# Patient Record
Sex: FEMALE | Race: BLACK OR AFRICAN AMERICAN | NOT HISPANIC OR LATINO | URBAN - METROPOLITAN AREA
[De-identification: names, ages, dates, MRNs, and addresses within clinical notes are randomized per-mention and may not be internally consistent; named-entity substitution may affect disease eponyms.]

---

## 2023-10-12 ENCOUNTER — EMERGENCY (EMERGENCY)
Facility: HOSPITAL | Age: 33
LOS: 1 days | Discharge: HOME | End: 2023-10-12
Attending: EMERGENCY MEDICINE | Admitting: STUDENT IN AN ORGANIZED HEALTH CARE EDUCATION/TRAINING PROGRAM
Payer: COMMERCIAL

## 2023-10-12 VITALS
TEMPERATURE: 98 F | OXYGEN SATURATION: 99 % | DIASTOLIC BLOOD PRESSURE: 63 MMHG | SYSTOLIC BLOOD PRESSURE: 126 MMHG | HEART RATE: 94 BPM | RESPIRATION RATE: 16 BRPM

## 2023-10-12 LAB
ALBUMIN SERPL ELPH-MCNC: 4.4 G/DL — SIGNIFICANT CHANGE UP (ref 3.3–5)
ALBUMIN SERPL ELPH-MCNC: 4.4 G/DL — SIGNIFICANT CHANGE UP (ref 3.3–5)
ALP SERPL-CCNC: 72 U/L — SIGNIFICANT CHANGE UP (ref 40–120)
ALP SERPL-CCNC: 72 U/L — SIGNIFICANT CHANGE UP (ref 40–120)
ALT FLD-CCNC: 6 U/L — SIGNIFICANT CHANGE UP (ref 4–33)
ALT FLD-CCNC: 6 U/L — SIGNIFICANT CHANGE UP (ref 4–33)
ANION GAP SERPL CALC-SCNC: 11 MMOL/L — SIGNIFICANT CHANGE UP (ref 7–14)
ANION GAP SERPL CALC-SCNC: 11 MMOL/L — SIGNIFICANT CHANGE UP (ref 7–14)
APPEARANCE UR: ABNORMAL
AST SERPL-CCNC: 18 U/L — SIGNIFICANT CHANGE UP (ref 4–32)
AST SERPL-CCNC: 18 U/L — SIGNIFICANT CHANGE UP (ref 4–32)
BACTERIA # UR AUTO: ABNORMAL /HPF
BASOPHILS # BLD AUTO: 0.02 K/UL — SIGNIFICANT CHANGE UP (ref 0–0.2)
BASOPHILS NFR BLD AUTO: 0.2 % — SIGNIFICANT CHANGE UP (ref 0–2)
BILIRUB SERPL-MCNC: 0.5 MG/DL — SIGNIFICANT CHANGE UP (ref 0.2–1.2)
BILIRUB SERPL-MCNC: 0.5 MG/DL — SIGNIFICANT CHANGE UP (ref 0.2–1.2)
BILIRUB UR-MCNC: NEGATIVE — SIGNIFICANT CHANGE UP
BUN SERPL-MCNC: 12 MG/DL — SIGNIFICANT CHANGE UP (ref 7–23)
BUN SERPL-MCNC: 12 MG/DL — SIGNIFICANT CHANGE UP (ref 7–23)
CALCIUM SERPL-MCNC: 10.1 MG/DL — SIGNIFICANT CHANGE UP (ref 8.4–10.5)
CALCIUM SERPL-MCNC: 10.1 MG/DL — SIGNIFICANT CHANGE UP (ref 8.4–10.5)
CAST: 0 /LPF — SIGNIFICANT CHANGE UP (ref 0–4)
CHLORIDE SERPL-SCNC: 101 MMOL/L — SIGNIFICANT CHANGE UP (ref 98–107)
CHLORIDE SERPL-SCNC: 101 MMOL/L — SIGNIFICANT CHANGE UP (ref 98–107)
CO2 SERPL-SCNC: 26 MMOL/L — SIGNIFICANT CHANGE UP (ref 22–31)
CO2 SERPL-SCNC: 26 MMOL/L — SIGNIFICANT CHANGE UP (ref 22–31)
COLOR SPEC: YELLOW — SIGNIFICANT CHANGE UP
CREAT SERPL-MCNC: 0.63 MG/DL — SIGNIFICANT CHANGE UP (ref 0.5–1.3)
CREAT SERPL-MCNC: 0.63 MG/DL — SIGNIFICANT CHANGE UP (ref 0.5–1.3)
DIFF PNL FLD: ABNORMAL
EGFR: 119 ML/MIN/1.73M2 — SIGNIFICANT CHANGE UP
EGFR: 119 ML/MIN/1.73M2 — SIGNIFICANT CHANGE UP
EOSINOPHIL # BLD AUTO: 0.12 K/UL — SIGNIFICANT CHANGE UP (ref 0–0.5)
EOSINOPHIL NFR BLD AUTO: 1.3 % — SIGNIFICANT CHANGE UP (ref 0–6)
GLUCOSE SERPL-MCNC: 94 MG/DL — SIGNIFICANT CHANGE UP (ref 70–99)
GLUCOSE SERPL-MCNC: 94 MG/DL — SIGNIFICANT CHANGE UP (ref 70–99)
GLUCOSE UR QL: NEGATIVE MG/DL — SIGNIFICANT CHANGE UP
HCG SERPL-ACNC: <1 MIU/ML — SIGNIFICANT CHANGE UP
HCG SERPL-ACNC: <1 MIU/ML — SIGNIFICANT CHANGE UP
HCT VFR BLD CALC: 36.7 % — SIGNIFICANT CHANGE UP (ref 34.5–45)
HGB BLD-MCNC: 12.1 G/DL — SIGNIFICANT CHANGE UP (ref 11.5–15.5)
IANC: 5.99 K/UL — SIGNIFICANT CHANGE UP (ref 1.8–7.4)
IMM GRANULOCYTES NFR BLD AUTO: 0.3 % — SIGNIFICANT CHANGE UP (ref 0–0.9)
KETONES UR-MCNC: NEGATIVE MG/DL — SIGNIFICANT CHANGE UP
LEUKOCYTE ESTERASE UR-ACNC: ABNORMAL
LIDOCAIN IGE QN: 13 U/L — SIGNIFICANT CHANGE UP (ref 7–60)
LIDOCAIN IGE QN: 13 U/L — SIGNIFICANT CHANGE UP (ref 7–60)
LYMPHOCYTES # BLD AUTO: 2.3 K/UL — SIGNIFICANT CHANGE UP (ref 1–3.3)
LYMPHOCYTES # BLD AUTO: 24.7 % — SIGNIFICANT CHANGE UP (ref 13–44)
MCHC RBC-ENTMCNC: 28.7 PG — SIGNIFICANT CHANGE UP (ref 27–34)
MCHC RBC-ENTMCNC: 33 GM/DL — SIGNIFICANT CHANGE UP (ref 32–36)
MCV RBC AUTO: 87.2 FL — SIGNIFICANT CHANGE UP (ref 80–100)
MONOCYTES # BLD AUTO: 0.85 K/UL — SIGNIFICANT CHANGE UP (ref 0–0.9)
MONOCYTES NFR BLD AUTO: 9.1 % — SIGNIFICANT CHANGE UP (ref 2–14)
NEUTROPHILS # BLD AUTO: 5.99 K/UL — SIGNIFICANT CHANGE UP (ref 1.8–7.4)
NEUTROPHILS NFR BLD AUTO: 64.4 % — SIGNIFICANT CHANGE UP (ref 43–77)
NITRITE UR-MCNC: POSITIVE
NRBC # BLD: 0 /100 WBCS — SIGNIFICANT CHANGE UP (ref 0–0)
NRBC # FLD: 0 K/UL — SIGNIFICANT CHANGE UP (ref 0–0)
PH UR: 6 — SIGNIFICANT CHANGE UP (ref 5–8)
PLATELET # BLD AUTO: 278 K/UL — SIGNIFICANT CHANGE UP (ref 150–400)
POTASSIUM SERPL-MCNC: 4.4 MMOL/L — SIGNIFICANT CHANGE UP (ref 3.5–5.3)
POTASSIUM SERPL-MCNC: 4.4 MMOL/L — SIGNIFICANT CHANGE UP (ref 3.5–5.3)
POTASSIUM SERPL-SCNC: 4.4 MMOL/L — SIGNIFICANT CHANGE UP (ref 3.5–5.3)
POTASSIUM SERPL-SCNC: 4.4 MMOL/L — SIGNIFICANT CHANGE UP (ref 3.5–5.3)
PROT SERPL-MCNC: 8 G/DL — SIGNIFICANT CHANGE UP (ref 6–8.3)
PROT SERPL-MCNC: 8 G/DL — SIGNIFICANT CHANGE UP (ref 6–8.3)
PROT UR-MCNC: 30 MG/DL
RBC # BLD: 4.21 M/UL — SIGNIFICANT CHANGE UP (ref 3.8–5.2)
RBC # FLD: 14.1 % — SIGNIFICANT CHANGE UP (ref 10.3–14.5)
RBC CASTS # UR COMP ASSIST: 11 /HPF — HIGH (ref 0–4)
SODIUM SERPL-SCNC: 138 MMOL/L — SIGNIFICANT CHANGE UP (ref 135–145)
SODIUM SERPL-SCNC: 138 MMOL/L — SIGNIFICANT CHANGE UP (ref 135–145)
SP GR SPEC: 1.01 — SIGNIFICANT CHANGE UP (ref 1–1.03)
SQUAMOUS # UR AUTO: 2 /HPF — SIGNIFICANT CHANGE UP (ref 0–5)
UROBILINOGEN FLD QL: 0.2 MG/DL — SIGNIFICANT CHANGE UP (ref 0.2–1)
WBC # BLD: 9.31 K/UL — SIGNIFICANT CHANGE UP (ref 3.8–10.5)
WBC # FLD AUTO: 9.31 K/UL — SIGNIFICANT CHANGE UP (ref 3.8–10.5)
WBC UR QL: 289 /HPF — HIGH (ref 0–5)

## 2023-10-12 PROCEDURE — G1004: CPT

## 2023-10-12 PROCEDURE — 76830 TRANSVAGINAL US NON-OB: CPT | Mod: 26

## 2023-10-12 PROCEDURE — 74177 CT ABD & PELVIS W/CONTRAST: CPT | Mod: 26,ME

## 2023-10-12 PROCEDURE — 99223 1ST HOSP IP/OBS HIGH 75: CPT

## 2023-10-12 RX ORDER — KETOROLAC TROMETHAMINE 30 MG/ML
15 SYRINGE (ML) INJECTION EVERY 6 HOURS
Refills: 0 | Status: DISCONTINUED | OUTPATIENT
Start: 2023-10-12 | End: 2023-10-13

## 2023-10-12 RX ORDER — CEFTRIAXONE 500 MG/1
500 INJECTION, POWDER, FOR SOLUTION INTRAMUSCULAR; INTRAVENOUS ONCE
Refills: 0 | Status: COMPLETED | OUTPATIENT
Start: 2023-10-12 | End: 2023-10-12

## 2023-10-12 RX ORDER — SODIUM CHLORIDE 9 MG/ML
1000 INJECTION INTRAMUSCULAR; INTRAVENOUS; SUBCUTANEOUS
Refills: 0 | Status: DISCONTINUED | OUTPATIENT
Start: 2023-10-12 | End: 2023-10-12

## 2023-10-12 RX ORDER — KETOROLAC TROMETHAMINE 30 MG/ML
15 SYRINGE (ML) INJECTION ONCE
Refills: 0 | Status: DISCONTINUED | OUTPATIENT
Start: 2023-10-12 | End: 2023-10-12

## 2023-10-12 RX ORDER — ONDANSETRON 8 MG/1
4 TABLET, FILM COATED ORAL EVERY 8 HOURS
Refills: 0 | Status: DISCONTINUED | OUTPATIENT
Start: 2023-10-12 | End: 2023-10-15

## 2023-10-12 RX ORDER — CEFTRIAXONE 500 MG/1
1000 INJECTION, POWDER, FOR SOLUTION INTRAMUSCULAR; INTRAVENOUS ONCE
Refills: 0 | Status: COMPLETED | OUTPATIENT
Start: 2023-10-12 | End: 2023-10-12

## 2023-10-12 RX ORDER — SODIUM CHLORIDE 9 MG/ML
1000 INJECTION INTRAMUSCULAR; INTRAVENOUS; SUBCUTANEOUS ONCE
Refills: 0 | Status: COMPLETED | OUTPATIENT
Start: 2023-10-12 | End: 2023-10-12

## 2023-10-12 RX ORDER — CEFTRIAXONE 500 MG/1
1000 INJECTION, POWDER, FOR SOLUTION INTRAMUSCULAR; INTRAVENOUS EVERY 24 HOURS
Refills: 0 | Status: DISCONTINUED | OUTPATIENT
Start: 2023-10-12 | End: 2023-10-15

## 2023-10-12 RX ORDER — MORPHINE SULFATE 50 MG/1
4 CAPSULE, EXTENDED RELEASE ORAL ONCE
Refills: 0 | Status: DISCONTINUED | OUTPATIENT
Start: 2023-10-12 | End: 2023-10-12

## 2023-10-12 RX ORDER — ACETAMINOPHEN 500 MG
1000 TABLET ORAL ONCE
Refills: 0 | Status: COMPLETED | OUTPATIENT
Start: 2023-10-12 | End: 2023-10-12

## 2023-10-12 RX ORDER — OXYCODONE HYDROCHLORIDE 5 MG/1
5 TABLET ORAL EVERY 6 HOURS
Refills: 0 | Status: DISCONTINUED | OUTPATIENT
Start: 2023-10-12 | End: 2023-10-13

## 2023-10-12 RX ORDER — SODIUM CHLORIDE 9 MG/ML
1000 INJECTION INTRAMUSCULAR; INTRAVENOUS; SUBCUTANEOUS
Refills: 0 | Status: DISCONTINUED | OUTPATIENT
Start: 2023-10-12 | End: 2023-10-15

## 2023-10-12 RX ORDER — ONDANSETRON 8 MG/1
4 TABLET, FILM COATED ORAL ONCE
Refills: 0 | Status: COMPLETED | OUTPATIENT
Start: 2023-10-12 | End: 2023-10-12

## 2023-10-12 RX ORDER — ACETAMINOPHEN 500 MG
975 TABLET ORAL EVERY 6 HOURS
Refills: 0 | Status: DISCONTINUED | OUTPATIENT
Start: 2023-10-12 | End: 2023-10-15

## 2023-10-12 RX ADMIN — ONDANSETRON 4 MILLIGRAM(S): 8 TABLET, FILM COATED ORAL at 11:25

## 2023-10-12 RX ADMIN — Medication 100 MILLIGRAM(S): at 15:57

## 2023-10-12 RX ADMIN — CEFTRIAXONE 100 MILLIGRAM(S): 500 INJECTION, POWDER, FOR SOLUTION INTRAMUSCULAR; INTRAVENOUS at 13:20

## 2023-10-12 RX ADMIN — Medication 400 MILLIGRAM(S): at 16:58

## 2023-10-12 RX ADMIN — Medication 15 MILLIGRAM(S): at 21:49

## 2023-10-12 RX ADMIN — MORPHINE SULFATE 4 MILLIGRAM(S): 50 CAPSULE, EXTENDED RELEASE ORAL at 13:19

## 2023-10-12 RX ADMIN — Medication 15 MILLIGRAM(S): at 14:00

## 2023-10-12 RX ADMIN — SODIUM CHLORIDE 200 MILLILITER(S): 9 INJECTION INTRAMUSCULAR; INTRAVENOUS; SUBCUTANEOUS at 22:31

## 2023-10-12 RX ADMIN — CEFTRIAXONE 500 MILLIGRAM(S): 500 INJECTION, POWDER, FOR SOLUTION INTRAMUSCULAR; INTRAVENOUS at 15:57

## 2023-10-12 RX ADMIN — SODIUM CHLORIDE 1000 MILLILITER(S): 9 INJECTION INTRAMUSCULAR; INTRAVENOUS; SUBCUTANEOUS at 11:26

## 2023-10-12 RX ADMIN — SODIUM CHLORIDE 1000 MILLILITER(S): 9 INJECTION INTRAMUSCULAR; INTRAVENOUS; SUBCUTANEOUS at 16:58

## 2023-10-12 RX ADMIN — MORPHINE SULFATE 4 MILLIGRAM(S): 50 CAPSULE, EXTENDED RELEASE ORAL at 11:25

## 2023-10-12 NOTE — ED ADULT TRIAGE NOTE - HEART RATE (BEATS/MIN)
94 Gabapentin Counseling: I discussed with the patient the risks of gabapentin including but not limited to dizziness, somnolence, fatigue and ataxia.

## 2023-10-12 NOTE — ED CDU PROVIDER INITIAL DAY NOTE - PHYSICAL EXAMINATION
CONSTITUTIONAL: Well-appearing; well-nourished; in no apparent distress. Non-toxic appearing.   NEURO: Alert & oriented. Gait steady without assistance. Sensory and motor functions are grossly intact.  PSYCH: Mood appropriate. Thought processes intact.   NECK: Supple  CARD: Regular rate and rhythm, no murmurs  RESP: No accessory muscle use; breath sounds clear and equal bilaterally; no wheezes, rhonchi, or rales     ABD: Soft; non-distended. (+) suprapubic abdominal pain. No guarding or rebound.   : No CVA tenderness b/l.   MUSCULOSKELETAL/EXTREMITIES: FROM in all four extremities; no extremity edema.  SKIN: Warm; dry; no apparent lesions or exudate

## 2023-10-12 NOTE — ED PROVIDER NOTE - OBJECTIVE STATEMENT
34 y/o female with no significant PMHx who presented to the ED for RLQ abd pain X 1 days. Pt states over the past 2 days having RLQ abd pain with nausea, vomiting, and diarrhea. Pt does admit she did have Mong[place]ian food prior to symptoms. Pt denies any fever, chills, nausea, vomiting, SOB, chest pain, dysuria, or vaginal bleeding.

## 2023-10-12 NOTE — ED ADULT NURSE REASSESSMENT NOTE - NS ED NURSE REASSESS COMMENT FT1
Received report from Day Dominik Campbell: Pt A&Ox4, ambulatory at baseline, complaining of lower abdomen, medicated as per orders, orthostatic bp noted in flowsheet, pt endorses lightheadedness during ortho bp measurments, ROSA Braxton made aware, awaiting for further intervention, denies any other complaints at this moment, Report given to CDU, pt transported to CDU. Safety precautions implemented as per protocol, awaiting further MD orders, plan of care ongoing.

## 2023-10-12 NOTE — CONSULT NOTE ADULT - ASSESSMENT
32yo  LMP  presenting with acute on chronic RLQ pain. Patient with stable vital signs, afebrile. Physical exam notable for right adnexal tenderness to palpation, no cervical motion tenderness. WBC 9.31. TVUS showing right hydrosalpinx and a small fibroid. Hydrosalpinx is likely the cause of RLQ pain. Differential for cause of hydrosalpinx includes endometriosis, supported by the fact that pain worsens prior to menses. Low concern for PID due to no fever, no leukocytosis, and no cervical motion tenderness.  - No acute GYN intervention  - GC/Chlamydia cervical swab collected  - Do not recommend treatment for PID at this time  - Patient to follow up as scheduled at [facility] on 10/19 for long-term management of pelvic pain  - GYN will continue to follow, primary management per ED    Zoraida Duran, PGY2  d/w Dr. Shelby 32yo  LMP  presenting with acute on chronic RLQ pain. Patient with stable vital signs, afebrile. Physical exam notable for right adnexal tenderness to palpation, no cervical motion tenderness. WBC 9.31. TVUS showing right hydrosalpinx and a small fibroid. Hydrosalpinx is not likely the cause of RLQ pain. Differential for cause of hydrosalpinx includes endometriosis, supported by the fact that pain worsens prior to menses. Low concern for PID due to no fever, no leukocytosis, and no cervical motion tenderness.  - No acute GYN intervention  - GC/Chlamydia cervical swab collected  - Do not recommend treatment for PID at this time  - Patient to follow up as scheduled at [facility] on 10/19 for long-term management of pelvic pain  - GYN will continue to follow, primary management per ED    Zoraida Duran, PGY2  d/w Dr. Shelby    34y/o  LMP  with RLQ pain noted to have R hydrosalpix, no clinical signs os infection. To f/u with Gyn as an outpatient.  Fam Shelby M.D.

## 2023-10-12 NOTE — ED PROVIDER NOTE - PROGRESS NOTE DETAILS
DO Mala (PGY-3): Pelvic exam performed chaperoned by Stacie Lamb RN, NP student. Cervical os is closed and without erythema. Right adnexal tenderness. Everton Lott MD (PGY-3) Patient signed out to me by the day team.  She is a 33-year-old female presented originally with periumbilical pain, CT scan negative for appendicitis however right-sided tubular lesion was noted she had right-sided adnexal tenderness by her taking exam.  Currently being treated empirically for pelvic inflammatory disease. The patient's lab work is notable for evidence of UTI, serology unremarkable. Everton Lott MD (PGY-3) Vaginal ultrasound was remarkable for likely hydrosalpinx. patient is well-appearing now with recurrence of pain will write for additional IV Toradol.  Spoke to CDU to admit for continuous monitoring pain management and fluids.  Will get orthostatic blood pressures and ob/gyn consult. CDU consulted for pain management

## 2023-10-12 NOTE — CONSULT NOTE ADULT - SUBJECTIVE AND OBJECTIVE BOX
STEPH JC  33y  Female [id number]    HPI: 34yo  LMP  presenting with acute on chronic RLQ pain. Patient initially started having daily RLQ pain in August. She was evaluated in [facility] and treated with doxycycline and metronidazole for presumed PID. She continued to have pain after this treatment. She says that pain gets worse right before her menses. She has a history of irregular menses, occurring every 60 days, and dysmenorrhea. She followed up with [facility]. She was told that she has an ovarian cyst and a fibroid and was told to follow up on 10/19 for results of additional tests, patient uncertain which tests were performed. She is presenting today because RLQ pain became more severe and is not relieved with ibuprofen. She has also had vomiting and diarrhea since Monday after eating [place] food. Pain is relieved by Toradol in the ED. She denies purulent vaginal discharge and vaginal bleeding. She denies fever, chills, chest pain, shortness of breath, palpitations.       Name of GYN Physician: [facility]    POB:   x1, TOP s/p D&C x2    Pgyn: Newly diagnosed fibroid. Patient denies endometriosis, STI's, Abnormal pap smears. Sexually active with one partner for the past 5 months. Three lifetime sexual partners.    Home meds: multivitamin    [facility] Meds:   MEDICATIONS  (STANDING):  cefTRIAXone   IVPB 1000 milliGRAM(s) IV Intermittent every 24 hours  sodium chloride 0.9%. 1000 milliLiter(s) (200 mL/Hr) IV Continuous <Continuous>    MEDICATIONS  (PRN):  acetaminophen     Tablet .. 975 milliGRAM(s) Oral every 6 hours PRN Temp greater or equal to 38C (100.4F), Mild Pain (1 - 3)  ketorolac   Injectable 15 milliGRAM(s) IV Push every 6 hours PRN Moderate Pain (4 - 6)  ondansetron Injectable 4 milliGRAM(s) IV Push every 8 hours PRN Nausea and/or Vomiting  oxyCODONE    IR 5 milliGRAM(s) Oral every 6 hours PRN Severe Pain (7 - 10)    Allergies  latex (Unknown)  No Known Drug Allergies      PAST MEDICAL & SURGICAL HISTORY:  No pertinent past medical history      D&C x2        Social History:  Denies smoking use, drug use. +occasional social alcohol use        Vital Signs Last 24 Hrs  T(C): 36.9 (12 Oct 2023 20:05), Max: 36.9 (12 Oct 2023 20:05)  T(F): 98.5 (12 Oct 2023 20:05), Max: 98.5 (12 Oct 2023 20:05)  HR: 70 (12 Oct 2023 20:05) (70 - 94)  BP: 97/45 (12 Oct 2023 20:05) (95/52 - 126/63)  BP(mean): --  RR: 16 (12 Oct 2023 20:05) (16 - 18)  SpO2: 100% (12 Oct 2023 20:05) (99% - 100%)    Parameters below as of 12 Oct 2023 20:05  Patient On (Oxygen Delivery Method): room air        Physical Exam:   General: sitting comftorably in bed, NAD   Abd: Soft, mild RLQ tenderness to palpation, non-distended.    :  No bleeding on pad.  External labia wnl.  Bimanual exam with no cervical motion tenderness, 6wk size mobile uterus, +R adnexal tenderness, adnexa non palpable b/l.  Cervix closed   Speculum Exam: No active bleeding from os.  Physiologic discharge.          LABS:               12.1   9.31  )-----------( 278      ( 12 Oct 2023 11:11 )             36.7     10-12    138  |  101  |  12  ----------------------------<  94  4.4   |  26  |  0.63    Ca    10.1      12 Oct 2023 11:11    TPro  8.0  /  Alb  4.4  /  TBili  0.5  /  DBili  x   /  AST  18  /  ALT  6   /  AlkPhos  72  10-12    I&O's Detail      Urinalysis Basic - ( 12 Oct 2023 11:11 )    Color: Yellow / Appearance: Cloudy / S.015 / pH: x  Gluc: 94 mg/dL / Ketone: Negative mg/dL  / Bili: Negative / Urobili: 0.2 mg/dL   Blood: x / Protein: 30 mg/dL / Nitrite: Positive   Leuk Esterase: Large / RBC: 11 /HPF /  /HPF   Sq Epi: x / Non Sq Epi: 2 /HPF / Bacteria: Few /HPF        RADIOLOGY & ADDITIONAL STUDIES:    CTAP(10/12)  FINDINGS:  LOWER CHEST: Within normal limits.    LIVER: Within normal limits.  BILE DUCTS: Normal caliber.  GALLBLADDER: Within normal limits.  SPLEEN: Within normal limits.  PANCREAS: Within normal limits.  ADRENALS: Within normal limits.  KIDNEYS/URETERS: A small left interpole cyst. No hydronephrosis.    BLADDER: Within normal limits.  REPRODUCTIVE ORGANS: Normal uterus and bilateral ovaries. A tubular   cystic structure in the right adnexa, question hydrosalpinx.    BOWEL: No bowel obstruction. Appendix is normal.  PERITONEUM: No ascites. Mildly prominent lymph nodes in the ileal   mesentery measuring up to 1 cm in short axis (602:44), question   mesenteric adenitis.  VESSELS: Within normal limits.  RETROPERITONEUM/LYMPH NODES: No lymphadenopathy.  ABDOMINAL WALL: Within normal limits.  BONES: Within normal limits.    IMPRESSION:  Normal appendix.    Mildly prominent lymph nodes in the right lower quadrant/ileal mesentery,   question mesenteric adenitis.    A tubular cystic structure in the right adnexa suggestive of   hydrosalpinx. Consider more definitive characterization with pelvic   ultrasound.      TVUS (10/12)  FINDINGS:  Uterus: 8.2 cm x 5.5 cm x 6.2 cm. Intramural myoma measures 1.4 cm.  Endometrium: 8 mm. Within normal limits.    Right ovary: 3.8 cm x 2.7 cm x 3.2 cm. Within normal limits. Right corpus   luteum measures 2.2 cm. normal arterial and venous waveforms. Tubular   structure in the right adnexa consistent with hydrosalpinx.  Left ovary: 2.4 cm x 1.4 cm x 1.5 cm. Within normal limits.    Fluid: None.    IMPRESSION:  *  Small uterine myoma.  *  Tubular structure in the right consistent adnexa with hydrosalpinx.  *  Right corpus luteum.

## 2023-10-12 NOTE — ED PROVIDER NOTE - CLINICAL SUMMARY MEDICAL DECISION MAKING FREE TEXT BOX
32 y/o female with no significant PMHx who presented to the ED for RLQ abd pain X 1 days.   Concern for Appendicitis/Diverticulitis/Colitis/UTI  Labs, UA, CT, IVF, Antiemetics, Analgesia

## 2023-10-12 NOTE — ED CDU PROVIDER INITIAL DAY NOTE - NSICDXFAMILYHX_GEN_ALL_CORE_FT
FAMILY HISTORY:  No pertinent family history in first degree relatives FAMILY HISTORY:  Father  Still living? Yes, Estimated age: Age Unknown  FH: prostate cancer, Age at diagnosis: Age Unknown    Mother  Still living? Yes, Estimated age: Age Unknown  FH: HTN (hypertension), Age at diagnosis: Age Unknown    Sibling  Still living? Yes, Estimated age: Age Unknown  FH: asthma, Age at diagnosis: Age Unknown

## 2023-10-12 NOTE — ED PROVIDER NOTE - ATTENDING CONTRIBUTION TO CARE
Pt was seen and evaluated by me. Pt is a 32 y/o female with no significant PMHx who presented to the ED for RLQ abd pain X 1 days. Pt states over the past 2 days having RLQ abd pain with nausea, vomiting, and diarrhea. Pt does admit she did have [place] food prior to symptoms. Pt denies any fever, chills, nausea, vomiting, SOB, chest pain, dysuria, or vaginal bleeding.  VITALS: Vitals have been reviewed.  GEN APPEARANCE: Alert and cooperative, non-toxic appearing and in mild distress from abd pain  HEAD: Atraumatic, normocephalic.   EYES: PERRL, EOMI.   EARS: Gross hearing intact.   NOSE: No nasal discharge.   THROAT: MMM. Oral cavity and pharynx normal.   CV: RRR, S1S2, no c/r/m/g. No cyanosis or pallor. Extremities warm, well perfused. Cap refill <2 seconds. No bruits.   LUNGS: CTAB. No wheezing. No rales. No rhonchi. No diminished breath sounds.   ABDOMEN: Soft, tender to RLQ and suprapubic tenderness   MSK/EXT: Spine appears normal, no spine point tenderness.   NEURO: Alert, follows commands. Speech normal. Sensation and motor normal x4 extremities.   SKIN: Normal color for race, warm, dry and intact. No evidence of rash.  32 y/o female with no significant PMHx who presented to the ED for RLQ abd pain X 1 days.   Concern for Appendicitis/Diverticulitis/Colitis/UTI  Labs, UA, CT, IVF, Antiemetics, Analgesia

## 2023-10-12 NOTE — ED ADULT TRIAGE NOTE - CHIEF COMPLAINT QUOTE
Pt from home c/o lower abd pain, worse on rt side x 2 days associated with nausea, vomiting and diarrhea. Reports she had ultrasound 2 days ago but has not received results. Pt appears uncomfortable in triage. Denies vaginal bleeding/discharge, dysuria, hematuria.

## 2023-10-12 NOTE — ED ADULT NURSE NOTE - OBJECTIVE STATEMENT
33 year old female patient AOX4,  presents to the ED complains of severe non-radiating  right lower abdominal pain of 10/10 with diarrhea, nausea and vomiting for the last three days. Denied dysuria, hematuria, chills and fever. Reported having a strong smell in her urine. Denied any medical problem. 20ga IV to right AC. blood and urine collected and UCG completed. NS 1000ml, Zofran 4mg and morphine 4mg administered. CT scan is ordered. Will continue to monitor for any changes.

## 2023-10-12 NOTE — ED CDU PROVIDER INITIAL DAY NOTE - OBJECTIVE STATEMENT
33-year-old female with no pertinent past medical history not on any daily medications presented to the ED c/o abdominal pain x2-3 days.  Following work-up in the emergency department patient found to have UTI in addition to a right-sided hydrosalpinx that was confirmed on CT as well as transvaginal ultrasound.  Patient has no leukocytosis.  Afebrile.  Patient hypotensive with positive orthostatic vital signs.  Patient placed in CDU pending OB consult, pain management, IV fluids and antibiotics. 33-year-old female with no pertinent past medical history not on any daily medications presented to the ED c/o abdominal pain x2-3 days. Associated with chills, nausea, and vaginal discharge described as a light pinkish color with mild odor that started yesterday. Following work-up in the emergency department patient found to have UTI in addition to a right-sided hydrosalpinx that was confirmed on CT as well as transvaginal ultrasound.  Patient has no leukocytosis.  Afebrile.  Patient hypotensive with positive orthostatic vital signs.  Patient placed in CDU pending OB consult, pain management, IV fluids and antibiotics.

## 2023-10-12 NOTE — ED CDU PROVIDER INITIAL DAY NOTE - ATTENDING APP SHARED VISIT CONTRIBUTION OF CARE
Pt was seen and evaluated by me. Pt is a 34 y/o female with no significant PMHx who presented to the ED for RLQ abd pain X 1 days. Pt states over the past 2 days having RLQ abd pain with nausea, vomiting, and diarrhea. Pt does admit she did have [place] food prior to symptoms. Pt denies any fever, chills, nausea, vomiting, SOB, chest pain, dysuria, or vaginal bleeding. CT and US confirmed hydrosalpinx. Sent to OBS for pain control  VITALS: Vitals have been reviewed.  GEN APPEARANCE: Alert and cooperative, non-toxic appearing and in mild distress from abd pain  HEAD: Atraumatic, normocephalic.   EYES: PERRL, EOMI.   EARS: Gross hearing intact.   NOSE: No nasal discharge.   THROAT: MMM. Oral cavity and pharynx normal.   CV: RRR, S1S2, no c/r/m/g. No cyanosis or pallor. Extremities warm, well perfused. Cap refill <2 seconds. No bruits.   LUNGS: CTAB. No wheezing. No rales. No rhonchi. No diminished breath sounds.   ABDOMEN: Soft, tender to RLQ and suprapubic tenderness   MSK/EXT: Spine appears normal, no spine point tenderness.   NEURO: Alert, follows commands. Speech normal. Sensation and motor normal x4 extremities.   SKIN: Normal color for race, warm, dry and intact. No evidence of rash.  34 y/o female with no significant PMHx who presented to the ED for RLQ abd pain X 1 days.   Concern for PID/UTI  Sent to OBS for Antibiotics and pain control

## 2023-10-12 NOTE — ED CDU PROVIDER INITIAL DAY NOTE - NS ED ATTENDING STATEMENT MOD
This was a shared visit with the [facility]. I reviewed and verified the documentation and independently performed the documented:

## 2023-10-12 NOTE — ED PROVIDER NOTE - CONSTITUTIONAL, MLM
Awake, alert, oriented to person, place, time/situation and in mild distress from abd pain. normal...

## 2023-10-12 NOTE — ED ADULT NURSE REASSESSMENT NOTE - NS ED NURSE REASSESS COMMENT FT1
Patient stated that the pain medication is not giving her any relief. MD made aware and additional morphine 4mg  is ordered with 1MG of ceftriaxone Intravenously.

## 2023-10-13 VITALS
HEART RATE: 82 BPM | SYSTOLIC BLOOD PRESSURE: 109 MMHG | OXYGEN SATURATION: 100 % | RESPIRATION RATE: 18 BRPM | DIASTOLIC BLOOD PRESSURE: 49 MMHG | TEMPERATURE: 98 F

## 2023-10-13 PROCEDURE — 99238 HOSP IP/OBS DSCHRG MGMT 30/<: CPT

## 2023-10-13 RX ORDER — CEFDINIR 250 MG/5ML
1 POWDER, FOR SUSPENSION ORAL
Qty: 12 | Refills: 0
Start: 2023-10-13 | End: 2023-10-18

## 2023-10-13 RX ORDER — OXYCODONE HYDROCHLORIDE 5 MG/1
1 TABLET ORAL
Qty: 6 | Refills: 0
Start: 2023-10-13 | End: 2023-10-14

## 2023-10-13 RX ADMIN — Medication 15 MILLIGRAM(S): at 07:42

## 2023-10-13 RX ADMIN — Medication 15 MILLIGRAM(S): at 01:18

## 2023-10-13 RX ADMIN — Medication 15 MILLIGRAM(S): at 01:33

## 2023-10-13 RX ADMIN — SODIUM CHLORIDE 200 MILLILITER(S): 9 INJECTION INTRAMUSCULAR; INTRAVENOUS; SUBCUTANEOUS at 07:44

## 2023-10-13 RX ADMIN — OXYCODONE HYDROCHLORIDE 5 MILLIGRAM(S): 5 TABLET ORAL at 06:53

## 2023-10-13 RX ADMIN — SODIUM CHLORIDE 200 MILLILITER(S): 9 INJECTION INTRAMUSCULAR; INTRAVENOUS; SUBCUTANEOUS at 05:55

## 2023-10-13 RX ADMIN — Medication 15 MILLIGRAM(S): at 07:57

## 2023-10-13 RX ADMIN — OXYCODONE HYDROCHLORIDE 5 MILLIGRAM(S): 5 TABLET ORAL at 05:53

## 2023-10-13 NOTE — ED CDU PROVIDER DISPOSITION NOTE - PATIENT PORTAL LINK FT
You can access the FollowMyHealth Patient Portal offered by [facility] by registering at the following website: [email or web address]. By joining "[employer]’s FollowMyHealth portal, you will also be able to view your health information using other applications (apps) compatible with our system.

## 2023-10-13 NOTE — ED CDU PROVIDER SUBSEQUENT DAY NOTE - ATTENDING APP SHARED VISIT CONTRIBUTION OF CARE
33-year-old female with no pertinent past medical history not on any daily medications presented to the ED c/o abdominal pain x2-3 days. Found to have hydrosalpinx and UTI and admitted to CDU for abx and pain control. Evaluated by ObGyn who report no concern for PID and no other recommendations at this time. This morning pt reports pain is overall improved, especially RLQ pain but is now having some L side pain most likely related to UTI as CT yesterday was normal. Pain better under control today with oral oxy and tylenol. Will d/c with Rx for pain medications and abx for UTI. She will follow-up with her outpatient ObGyn (has an appointment scheduled for 10/19).

## 2023-10-13 NOTE — ED CDU PROVIDER SUBSEQUENT DAY NOTE - CLINICAL SUMMARY MEDICAL DECISION MAKING FREE TEXT BOX
33-year-old female with no pertinent past medical history not on any daily medications presented to the ED c/o abdominal pain x2-3 days. Associated with chills, nausea, and vaginal discharge described as a light pinkish color with mild odor that started yesterday. Following work-up in the emergency department patient found to have UTI in addition to a right-sided hydrosalpinx that was confirmed on CT as well as transvaginal ultrasound.  Patient has no leukocytosis.  Afebrile.  Patient hypotensive with positive orthostatic vital signs.  Patient placed in CDU pain management, IV fluids and antibiotics.

## 2023-10-13 NOTE — ED CDU PROVIDER DISPOSITION NOTE - NSFOLLOWUPINSTRUCTIONS_ED_ALL_ED_FT
Take cefdinir for 5 days.    Follow up with your obgyn in 1 week.     Return to the ER if having worsening pain, nausea, vomiting, fever, painful urination, blood in urine. Take cefdinir for another 6 days.    Take Tylenol 650mg (Two 325 mg pills) every 4-6 hours as needed for pain or fevers.     Take Motrin 600 mg every 8 hours as needed for moderate pain or fevers -- take with food.     Take oxycodone 5 mg one pill once every 8 hours as needed for severe pain -- causes drowsiness; DO NOT drink alcohol, drive, or operate heavy machinery with this medication.  Caution federal law prohibits the transfer of this drug to any person other  than the person for whom it was prescribed. May cause drowsiness.  Alcohol may intensify this effect.  Use care when operating dangerous machinery.  This prescription cannot be refilled. Using more of this medication than prescribed may cause serious breathing problems.     Follow up with your obgyn in 1 week.     Return to the ER if having worsening pain, nausea, vomiting, fever, painful urination, blood in urine.

## 2023-10-13 NOTE — ED CDU PROVIDER DISPOSITION NOTE - CLINICAL COURSE
33-year-old female with no pertinent past medical history not on any daily medications presented to the ED c/o abdominal pain x2-3 days. Associated with chills, nausea, and vaginal discharge described as a light pinkish color with mild odor that started yesterday. Following work-up in the emergency department patient found to have UTI in addition to a right-sided hydrosalpinx that was confirmed on CT as well as transvaginal ultrasound.  Patient has no leukocytosis.  Afebrile.  Patient hypotensive with positive orthostatic vital signs.  Patient placed in CDU pending OB consult, pain management, IV fluids and antibiotics. pt saw by OB team who had Low concern for PID, took GC/chlamydia swab, and recommended outpatient f/u. pt has upcoming appointment at [facility] on 10/19 for long-term management of pelvic pain. pt re-evaluated in the morning, feeling much better, pain under control with oxycodone and toradol and tylenol. mild left flank tenderness, might be related to UTI. stable for discharge on PO cefdinir and obgyn f/u.

## 2023-10-13 NOTE — ED CDU PROVIDER SUBSEQUENT DAY NOTE - PROGRESS NOTE DETAILS
pt re-evaluated in the morning, feeling much better, pain under control with oxycodone and toradol and tylenol. mild left flank tenderness, might be related to UTI. stable for discharge on PO cefdinir and obgyn f/u.

## 2023-10-13 NOTE — CHART NOTE - NSCHARTNOTEFT_GEN_A_CORE
SW alerted by RN that pt is cleared for DC and is requesting assistance with transportation. SW met with pt and confirmed address and contact information. SW arranged trip through [facility] (Inv# [id number]). Trip assigned to [employer] (p:[phone]) for 10:30am pickup.     No other SW needs identified at this time.

## 2023-10-13 NOTE — ED CDU PROVIDER SUBSEQUENT DAY NOTE - NSICDXFAMILYHX_GEN_ALL_CORE_FT
FAMILY HISTORY:  Father  Still living? Yes, Estimated age: Age Unknown  FH: prostate cancer, Age at diagnosis: Age Unknown    Mother  Still living? Yes, Estimated age: Age Unknown  FH: HTN (hypertension), Age at diagnosis: Age Unknown    Sibling  Still living? Yes, Estimated age: Age Unknown  FH: asthma, Age at diagnosis: Age Unknown

## 2023-10-13 NOTE — ED CDU PROVIDER SUBSEQUENT DAY NOTE - HISTORY
VSS with improving pressures. Pt resting comfortably in no distress. OB not recommending PID treatment so plan for IV abx for UTI and pain control.

## 2023-10-14 LAB
C TRACH RRNA SPEC QL NAA+PROBE: SIGNIFICANT CHANGE UP
N GONORRHOEA RRNA SPEC QL NAA+PROBE: SIGNIFICANT CHANGE UP
SPECIMEN SOURCE: SIGNIFICANT CHANGE UP

## 2024-03-14 NOTE — ED CDU PROVIDER SUBSEQUENT DAY NOTE - NS ED ATTENDING STATEMENT MOD
63 This was a shared visit with the [facility]. I reviewed and verified the documentation and independently performed the documented: